# Patient Record
Sex: FEMALE | Race: WHITE | NOT HISPANIC OR LATINO | Employment: PART TIME | URBAN - METROPOLITAN AREA
[De-identification: names, ages, dates, MRNs, and addresses within clinical notes are randomized per-mention and may not be internally consistent; named-entity substitution may affect disease eponyms.]

---

## 2019-08-06 ENCOUNTER — OFFICE VISIT (OUTPATIENT)
Dept: PODIATRY | Facility: CLINIC | Age: 44
End: 2019-08-06
Payer: COMMERCIAL

## 2019-08-06 VITALS
SYSTOLIC BLOOD PRESSURE: 135 MMHG | HEART RATE: 70 BPM | DIASTOLIC BLOOD PRESSURE: 90 MMHG | WEIGHT: 262 LBS | HEIGHT: 66 IN | BODY MASS INDEX: 42.11 KG/M2

## 2019-08-06 DIAGNOSIS — M72.2 PLANTAR FASCIITIS: Primary | ICD-10-CM

## 2019-08-06 DIAGNOSIS — M79.671 RIGHT FOOT PAIN: ICD-10-CM

## 2019-08-06 DIAGNOSIS — M77.31 CALCANEAL SPUR, RIGHT FOOT: ICD-10-CM

## 2019-08-06 DIAGNOSIS — M67.00 SHORT ACHILLES TENDON, UNSPECIFIED LATERALITY: ICD-10-CM

## 2019-08-06 PROCEDURE — 99203 OFFICE O/P NEW LOW 30 MIN: CPT | Performed by: PODIATRIST

## 2019-08-06 RX ORDER — CITALOPRAM 20 MG/1
20 TABLET ORAL DAILY
Refills: 0 | COMMUNITY
Start: 2019-07-20

## 2019-08-06 RX ORDER — LAMOTRIGINE 200 MG/1
200 TABLET ORAL
Refills: 0 | COMMUNITY
Start: 2019-07-20

## 2019-08-06 RX ORDER — LAMOTRIGINE 150 MG/1
150 TABLET ORAL
COMMUNITY

## 2019-08-06 RX ORDER — RANITIDINE 300 MG/1
TABLET ORAL
Refills: 0 | COMMUNITY
Start: 2019-07-11

## 2019-08-06 RX ORDER — BUPROPION HYDROCHLORIDE 300 MG/1
300 TABLET ORAL DAILY
Refills: 0 | COMMUNITY
Start: 2019-07-20

## 2019-08-06 RX ORDER — MELATONIN 10 MG
CAPSULE ORAL
COMMUNITY

## 2019-08-06 NOTE — PROGRESS NOTES
PATIENT:  Renee Ornelas  1975       ASSESSMENT:     1  Plantar fasciitis  XR heel / calcaneus 2+ vw right    Ambulatory referral to Physical Therapy   2  Right foot pain     3  Short Achilles tendon, unspecified laterality  Ambulatory referral to Physical Therapy   4  Calcaneal spur, right foot               PLAN:  Patient was counseled and educated on the condition and the diagnosis  X-ray was obtained and personally reviewed  The radiological findings were discussed with the patient  The exam and symptoms are consistent with plantar fasciitis  The diagnosis, treatment options and prognosis were discussed with the patient  Referred her to PT  Instructed supportive care, home exercise, icing, and proper footwear/ arch support  Discussed possible injection and surgery depending on the progress  Patient will return in 4 weeks for re-evaluation  Subjective:     HPI  The patient presents with chief complaint of right foot pain for more than 6 months  The symptoms presents around right plantar heel  Pain level is about 5-6 out of 10  The symptoms  have been worse since the onset  Pain increases with walking and standing  She also has increased pain in the morning  The patient does not recall any injury  Denied any swelling  No associated numbness or paresthesia  No significant weakness  The following portions of the patient's history were reviewed and updated as appropriate: allergies, current medications, past family history, past medical history, past social history, past surgical history and problem list   All pertinent labs and images were reviewed  Past Medical History  History reviewed  No pertinent past medical history  Past Surgical History  History reviewed  No pertinent surgical history       Allergies:  Levofloxacin; Penicillins; Sulfa antibiotics; and Sulfamethoxazole-trimethoprim    Medications:  Current Outpatient Medications   Medication Sig Dispense Refill    buPROPion (WELLBUTRIN XL) 300 mg 24 hr tablet Take 300 mg by mouth daily  0    citalopram (CeleXA) 20 mg tablet Take 20 mg by mouth daily  0    lamoTRIgine (LaMICtal) 150 MG tablet Take 150 mg by mouth      lamoTRIgine (LaMICtal) 200 MG tablet Take 200 mg by mouth daily at bedtime  0    Melatonin 10 MG CAPS Take by mouth      RA ALLERGY RELIEF 10 MG tablet   0    ranitidine (ZANTAC) 300 MG tablet   0     No current facility-administered medications for this visit  Social History:  Social History     Socioeconomic History    Marital status: /Civil Union     Spouse name: None    Number of children: None    Years of education: None    Highest education level: None   Occupational History    None   Social Needs    Financial resource strain: None    Food insecurity:     Worry: None     Inability: None    Transportation needs:     Medical: None     Non-medical: None   Tobacco Use    Smoking status: Former Smoker    Smokeless tobacco: Never Used   Substance and Sexual Activity    Alcohol use: Never     Frequency: Never    Drug use: Never    Sexual activity: None   Lifestyle    Physical activity:     Days per week: None     Minutes per session: None    Stress: None   Relationships    Social connections:     Talks on phone: None     Gets together: None     Attends Hindu service: None     Active member of club or organization: None     Attends meetings of clubs or organizations: None     Relationship status: None    Intimate partner violence:     Fear of current or ex partner: None     Emotionally abused: None     Physically abused: None     Forced sexual activity: None   Other Topics Concern    None   Social History Narrative    None          Review of Systems   Constitutional: Negative for chills and fever  Eyes: Negative for visual disturbance  Respiratory: Negative for cough and shortness of breath      Cardiovascular: Negative for chest pain and leg swelling  Gastrointestinal: Negative for constipation, diarrhea, nausea and vomiting  Musculoskeletal: Negative for joint swelling  Skin: Negative for color change and wound  Neurological: Negative for weakness and numbness  Hematological: Does not bruise/bleed easily  Psychiatric/Behavioral: Negative for confusion  Objective:      /90   Pulse 70   Ht 5' 6" (1 676 m)   Wt 119 kg (262 lb)   BMI 42 29 kg/m²          Physical Exam   Constitutional: She is oriented to person, place, and time  She appears well-developed and well-nourished  No distress  HENT:   Head: Normocephalic and atraumatic  Neck: Normal range of motion  Neck supple  Cardiovascular: Normal rate, regular rhythm and intact distal pulses  Pulmonary/Chest: Effort normal and breath sounds normal  No respiratory distress  Musculoskeletal:   Pain presents right plantar heel  Tight achilles tendon with equinus  Ankle dorsiflexion is 0 degree with knee extension  No edema  Neurological: She is alert and oriented to person, place, and time  She displays normal reflexes  No sensory deficit  Skin: Skin is warm and dry  Capillary refill takes less than 2 seconds  No rash noted  No erythema  Psychiatric: She has a normal mood and affect  Her behavior is normal    Vitals reviewed

## 2019-09-01 ENCOUNTER — TELEPHONE (OUTPATIENT)
Dept: OTHER | Facility: HOSPITAL | Age: 44
End: 2019-09-01

## 2019-10-08 ENCOUNTER — OFFICE VISIT (OUTPATIENT)
Dept: PODIATRY | Facility: CLINIC | Age: 44
End: 2019-10-08
Payer: COMMERCIAL

## 2019-10-08 VITALS — WEIGHT: 262 LBS | HEIGHT: 66 IN | BODY MASS INDEX: 42.11 KG/M2

## 2019-10-08 DIAGNOSIS — M77.31 CALCANEAL SPUR, RIGHT FOOT: ICD-10-CM

## 2019-10-08 DIAGNOSIS — M72.2 PLANTAR FASCIITIS: Primary | ICD-10-CM

## 2019-10-08 DIAGNOSIS — M79.671 RIGHT FOOT PAIN: ICD-10-CM

## 2019-10-08 DIAGNOSIS — M67.00 SHORT ACHILLES TENDON, UNSPECIFIED LATERALITY: ICD-10-CM

## 2019-10-08 PROCEDURE — 20550 NJX 1 TENDON SHEATH/LIGAMENT: CPT | Performed by: PODIATRIST

## 2019-10-08 PROCEDURE — 99213 OFFICE O/P EST LOW 20 MIN: CPT | Performed by: PODIATRIST

## 2019-10-08 RX ORDER — LIDOCAINE HYDROCHLORIDE 10 MG/ML
2 INJECTION, SOLUTION EPIDURAL; INFILTRATION; INTRACAUDAL; PERINEURAL ONCE
Status: COMPLETED | OUTPATIENT
Start: 2019-10-08 | End: 2019-10-08

## 2019-10-08 RX ORDER — OMEPRAZOLE 40 MG/1
40 CAPSULE, DELAYED RELEASE ORAL 2 TIMES DAILY
Refills: 0 | COMMUNITY
Start: 2019-09-27

## 2019-10-08 RX ORDER — CETIRIZINE HYDROCHLORIDE 10 MG/1
TABLET ORAL
COMMUNITY
Start: 2018-11-05

## 2019-10-08 RX ORDER — TRIAMCINOLONE ACETONIDE 40 MG/ML
20 INJECTION, SUSPENSION INTRA-ARTICULAR; INTRAMUSCULAR ONCE
Status: COMPLETED | OUTPATIENT
Start: 2019-10-08 | End: 2019-10-08

## 2019-10-08 RX ADMIN — LIDOCAINE HYDROCHLORIDE 2 ML: 10 INJECTION, SOLUTION EPIDURAL; INFILTRATION; INTRACAUDAL; PERINEURAL at 11:28

## 2019-10-08 RX ADMIN — TRIAMCINOLONE ACETONIDE 20 MG: 40 INJECTION, SUSPENSION INTRA-ARTICULAR; INTRAMUSCULAR at 11:28

## 2019-10-08 NOTE — PROGRESS NOTES
PATIENT:  Lisandro Headings  1975       ASSESSMENT:     1  Plantar fasciitis  lidocaine (PF) (XYLOCAINE-MPF) 1 % injection 2 mL    triamcinolone acetonide (KENALOG-40) 40 mg/mL injection 20 mg    Foot injection   2  Right foot pain  Foot injection   3  Short Achilles tendon, unspecified laterality     4  Calcaneal spur, right foot               PLAN:  Patient was counseled and educated on the condition and the diagnosis  The diagnosis, treatment options and prognosis were discussed with the patient  Treatment options were discussed and the patient wished to proceed with an injection  Injection was given as in the procedure  Instructed supportive care, icing, and resting  The patient will return in 3 weeks  Foot injection     Date/Time 10/8/2019 11:17 AM     Performed by  Niyah López DPM     Authorized by Niyah López DPM      Universal Protocol Consent: Verbal consent obtained  Risks and benefits: risks, benefits and alternatives were discussed  Consent given by: patient  Patient understanding: patient states understanding of the procedure being performed  Patient identity confirmed: verbally with patient  Time out: Immediately prior to procedure a "time out" was called to verify the correct patient, procedure, equipment, support staff and site/side marked as required  Site preparation: Isopropyl alcohol   Procedure Details   Procedure Notes:   Treatment options were discussed and the patient wished to proceed with an injection  A trigger point injection was given to right heel using 0 5cc Kenolog 40 and 2cc 1% Lidocaine pl  Patient tolerance: Patient tolerated the procedure well with no immediate complications               Subjective:     HPI  The patient presents with chief complaint of right foot pain for more than 6 months  The symptoms presents around right plantar heel  Pain level is about 5-6 out of 10  The symptoms  have been worse since the onset    Pain increases with walking and standing  She also has increased pain in the morning  The patient does not recall any injury  Denied any swelling  No associated numbness or paresthesia  No significant weakness  The following portions of the patient's history were reviewed and updated as appropriate: allergies, current medications, past family history, past medical history, past social history, past surgical history and problem list   All pertinent labs and images were reviewed  Past Medical History  History reviewed  No pertinent past medical history      Past Surgical History  Past Surgical History:   Procedure Laterality Date    BACK SURGERY      CHROMOSOME ANALYSIS, PRODUCTS OF CONCEPTION (HISTORICAL)      dnc    TUBAL LIGATION      WISDOM TOOTH EXTRACTION          Allergies:  Levofloxacin; Penicillins; Sulfa antibiotics; and Sulfamethoxazole-trimethoprim    Medications:  Current Outpatient Medications   Medication Sig Dispense Refill    buPROPion (WELLBUTRIN XL) 300 mg 24 hr tablet Take 300 mg by mouth daily  0    cetirizine (ZyrTEC) 10 mg tablet       citalopram (CeleXA) 20 mg tablet Take 20 mg by mouth daily  0    lamoTRIgine (LaMICtal) 150 MG tablet Take 150 mg by mouth      lamoTRIgine (LaMICtal) 200 MG tablet Take 200 mg by mouth daily at bedtime  0    Melatonin 10 MG CAPS Take by mouth      omeprazole (PriLOSEC) 40 MG capsule Take 40 mg by mouth 2 (two) times a day  0    ranitidine (ZANTAC) 300 MG tablet   0     Current Facility-Administered Medications   Medication Dose Route Frequency Provider Last Rate Last Dose    lidocaine (PF) (XYLOCAINE-MPF) 1 % injection 2 mL  2 mL Injection Once Natalia Havers, DPM        triamcinolone acetonide (KENALOG-40) 40 mg/mL injection 20 mg  20 mg Subcutaneous Once Natalia Betts DPTENZIN           Social History:  Social History     Socioeconomic History    Marital status: /Civil Union     Spouse name: None    Number of children: None    Years of education: None    Highest education level: None   Occupational History    None   Social Needs    Financial resource strain: None    Food insecurity:     Worry: None     Inability: None    Transportation needs:     Medical: None     Non-medical: None   Tobacco Use    Smoking status: Former Smoker    Smokeless tobacco: Never Used   Substance and Sexual Activity    Alcohol use: Never     Frequency: Never    Drug use: Never    Sexual activity: None   Lifestyle    Physical activity:     Days per week: None     Minutes per session: None    Stress: None   Relationships    Social connections:     Talks on phone: None     Gets together: None     Attends Catholic service: None     Active member of club or organization: None     Attends meetings of clubs or organizations: None     Relationship status: None    Intimate partner violence:     Fear of current or ex partner: None     Emotionally abused: None     Physically abused: None     Forced sexual activity: None   Other Topics Concern    None   Social History Narrative    None          Review of Systems   Constitutional: Negative for chills and fever  Respiratory: Negative for shortness of breath  Cardiovascular: Negative for chest pain and leg swelling  Gastrointestinal: Negative for nausea and vomiting  Musculoskeletal: Negative for joint swelling  Neurological: Negative for weakness and numbness  Objective:      Ht 5' 6" (1 676 m)   Wt 119 kg (262 lb)   BMI 42 29 kg/m²          Physical Exam   Constitutional: She is oriented to person, place, and time  She appears well-developed and well-nourished  No distress  Cardiovascular: Normal rate, regular rhythm and intact distal pulses  Pulmonary/Chest: Effort normal and breath sounds normal  No respiratory distress  Musculoskeletal: She exhibits no edema  Pain presents right plantar and plantarmedial heel  Tight achilles tendon with equinus    Ankle dorsiflexion is still about 0 degree with knee extension  No edema  Tinel sign negative at tarsal tunnel  Neurological: She is alert and oriented to person, place, and time  No sensory deficit  Skin: Capillary refill takes less than 2 seconds  No rash noted  No erythema  Vitals reviewed

## 2019-10-24 ENCOUNTER — OFFICE VISIT (OUTPATIENT)
Dept: PODIATRY | Facility: CLINIC | Age: 44
End: 2019-10-24
Payer: COMMERCIAL

## 2019-10-24 VITALS
HEART RATE: 70 BPM | SYSTOLIC BLOOD PRESSURE: 132 MMHG | HEIGHT: 66 IN | BODY MASS INDEX: 41.78 KG/M2 | WEIGHT: 260 LBS | DIASTOLIC BLOOD PRESSURE: 82 MMHG

## 2019-10-24 DIAGNOSIS — M77.31 CALCANEAL SPUR, RIGHT FOOT: ICD-10-CM

## 2019-10-24 DIAGNOSIS — M72.2 PLANTAR FASCIITIS: Primary | ICD-10-CM

## 2019-10-24 DIAGNOSIS — M67.00 SHORT ACHILLES TENDON, UNSPECIFIED LATERALITY: ICD-10-CM

## 2019-10-24 DIAGNOSIS — M79.671 RIGHT FOOT PAIN: ICD-10-CM

## 2019-10-24 PROCEDURE — 20550 NJX 1 TENDON SHEATH/LIGAMENT: CPT | Performed by: PODIATRIST

## 2019-10-24 PROCEDURE — 99213 OFFICE O/P EST LOW 20 MIN: CPT | Performed by: PODIATRIST

## 2019-10-24 RX ORDER — LIDOCAINE HYDROCHLORIDE 10 MG/ML
1 INJECTION, SOLUTION EPIDURAL; INFILTRATION; INTRACAUDAL; PERINEURAL ONCE
Status: COMPLETED | OUTPATIENT
Start: 2019-10-24 | End: 2019-10-24

## 2019-10-24 RX ORDER — TRIAMCINOLONE ACETONIDE 40 MG/ML
20 INJECTION, SUSPENSION INTRA-ARTICULAR; INTRAMUSCULAR ONCE
Status: COMPLETED | OUTPATIENT
Start: 2019-10-24 | End: 2019-10-24

## 2019-10-24 RX ADMIN — TRIAMCINOLONE ACETONIDE 20 MG: 40 INJECTION, SUSPENSION INTRA-ARTICULAR; INTRAMUSCULAR at 17:46

## 2019-10-24 RX ADMIN — LIDOCAINE HYDROCHLORIDE 1 ML: 10 INJECTION, SOLUTION EPIDURAL; INFILTRATION; INTRACAUDAL; PERINEURAL at 17:46

## 2019-10-24 NOTE — PROGRESS NOTES
PATIENT:  Deacon Karimi  1975       ASSESSMENT:     1  Plantar fasciitis  lidocaine (PF) (XYLOCAINE-MPF) 1 % injection 1 mL    triamcinolone acetonide (KENALOG-40) 40 mg/mL injection 20 mg    Foot injection   2  Short Achilles tendon, unspecified laterality     3  Calcaneal spur, right foot     4  Right foot pain               PLAN:  Patient was counseled and educated on the condition and the diagnosis  The diagnosis, treatment options and prognosis were discussed with the patient  Treatment options were discussed and the patient wished to proceed with a second injection  Injection was given as in the procedure  Instructed supportive care, icing, and resting  Discussed possible surgical treatment depending on the progress  The patient will return in 3 weeks  Foot injection     Date/Time 10/24/2019 5:44 PM     Performed by  Emilie Batres DPM     Authorized by Emilie Batres DPM      Universal Protocol Consent: Verbal consent obtained  Risks and benefits: risks, benefits and alternatives were discussed  Consent given by: patient  Patient understanding: patient states understanding of the procedure being performed  Patient identity confirmed: verbally with patient  Time out: Immediately prior to procedure a "time out" was called to verify the correct patient, procedure, equipment, support staff and site/side marked as required  Site preparation: Isopropyl alcohol   Procedure Details   Procedure Notes:   Treatment options were discussed and the patient wished to proceed with an injection  A trigger point injection was given to right heel using 0 5cc Kenolog 40 and 2cc 1% Lidocaine pl  Patient tolerance: Patient tolerated the procedure well with no immediate complications               Subjective:     HPI  The patient presents for right foot evaluation  She felt great for about 1 week after the injection  Pain slowly returns and is about 5 out of 10 sporadically    Denied any swelling  No associated numbness or paresthesia  No significant weakness  The following portions of the patient's history were reviewed and updated as appropriate: allergies, current medications, past family history, past medical history, past social history, past surgical history and problem list   All pertinent labs and images were reviewed  Past Medical History  History reviewed  No pertinent past medical history      Past Surgical History  Past Surgical History:   Procedure Laterality Date    BACK SURGERY      CHROMOSOME ANALYSIS, PRODUCTS OF CONCEPTION (HISTORICAL)      dnc    TUBAL LIGATION      WISDOM TOOTH EXTRACTION          Allergies:  Levofloxacin; Penicillins; Sulfa antibiotics; and Sulfamethoxazole-trimethoprim    Medications:  Current Outpatient Medications   Medication Sig Dispense Refill    buPROPion (WELLBUTRIN XL) 300 mg 24 hr tablet Take 300 mg by mouth daily  0    cetirizine (ZyrTEC) 10 mg tablet       citalopram (CeleXA) 20 mg tablet Take 20 mg by mouth daily  0    lamoTRIgine (LaMICtal) 150 MG tablet Take 150 mg by mouth      lamoTRIgine (LaMICtal) 200 MG tablet Take 200 mg by mouth daily at bedtime  0    Melatonin 10 MG CAPS Take by mouth      omeprazole (PriLOSEC) 40 MG capsule Take 40 mg by mouth 2 (two) times a day  0    ranitidine (ZANTAC) 300 MG tablet   0     Current Facility-Administered Medications   Medication Dose Route Frequency Provider Last Rate Last Dose    lidocaine (PF) (XYLOCAINE-MPF) 1 % injection 1 mL  1 mL Injection Once Mallorie Fuelling, DPM        triamcinolone acetonide (KENALOG-40) 40 mg/mL injection 20 mg  20 mg Subcutaneous Once Mallorie Fuelling, DPM           Social History:  Social History     Socioeconomic History    Marital status: /Civil Union     Spouse name: None    Number of children: None    Years of education: None    Highest education level: None   Occupational History    None   Social Needs    Financial resource strain: None    Food insecurity:     Worry: None     Inability: None    Transportation needs:     Medical: None     Non-medical: None   Tobacco Use    Smoking status: Former Smoker    Smokeless tobacco: Never Used   Substance and Sexual Activity    Alcohol use: Never     Frequency: Never    Drug use: Never    Sexual activity: None   Lifestyle    Physical activity:     Days per week: None     Minutes per session: None    Stress: None   Relationships    Social connections:     Talks on phone: None     Gets together: None     Attends Synagogue service: None     Active member of club or organization: None     Attends meetings of clubs or organizations: None     Relationship status: None    Intimate partner violence:     Fear of current or ex partner: None     Emotionally abused: None     Physically abused: None     Forced sexual activity: None   Other Topics Concern    None   Social History Narrative    None          Review of Systems   Constitutional: Negative for chills and fever  Respiratory: Negative for shortness of breath  Cardiovascular: Negative for chest pain and leg swelling  Gastrointestinal: Negative for nausea and vomiting  Musculoskeletal: Negative for joint swelling  Neurological: Negative for weakness and numbness  Objective:      /82   Pulse 70   Ht 5' 6" (1 676 m)   Wt 118 kg (260 lb)   BMI 41 97 kg/m²          Physical Exam   Constitutional: She is oriented to person, place, and time  She appears well-developed and well-nourished  No distress  Cardiovascular: Normal rate, regular rhythm and intact distal pulses  Pulmonary/Chest: Effort normal and breath sounds normal  No respiratory distress  Musculoskeletal: She exhibits no edema  Decreased pain right plantar and plantarmedial heel  Tight achilles tendon with equinus  Ankle dorsiflexion is still about 0 degree with knee extension  No edema  Tinel sign negative at tarsal tunnel     Neurological: She is alert and oriented to person, place, and time  No sensory deficit  Skin: Capillary refill takes less than 2 seconds  No rash noted  No erythema  Vitals reviewed

## 2021-01-07 ENCOUNTER — IMMUNIZATIONS (OUTPATIENT)
Dept: FAMILY MEDICINE CLINIC | Facility: HOSPITAL | Age: 46
End: 2021-01-07

## 2021-01-07 DIAGNOSIS — Z23 ENCOUNTER FOR IMMUNIZATION: ICD-10-CM

## 2021-01-07 PROCEDURE — 91301 SARS-COV-2 / COVID-19 MRNA VACCINE (MODERNA) 100 MCG: CPT | Performed by: EMERGENCY MEDICINE

## 2021-01-07 PROCEDURE — 0011A SARS-COV-2 / COVID-19 MRNA VACCINE (MODERNA) 100 MCG: CPT | Performed by: EMERGENCY MEDICINE

## 2021-02-04 ENCOUNTER — IMMUNIZATIONS (OUTPATIENT)
Dept: FAMILY MEDICINE CLINIC | Facility: HOSPITAL | Age: 46
End: 2021-02-04

## 2021-02-04 DIAGNOSIS — Z23 ENCOUNTER FOR IMMUNIZATION: Primary | ICD-10-CM

## 2021-02-04 PROCEDURE — 91301 SARS-COV-2 / COVID-19 MRNA VACCINE (MODERNA) 100 MCG: CPT | Performed by: ANESTHESIOLOGY

## 2021-02-04 PROCEDURE — 0012A SARS-COV-2 / COVID-19 MRNA VACCINE (MODERNA) 100 MCG: CPT | Performed by: ANESTHESIOLOGY
